# Patient Record
Sex: MALE
[De-identification: names, ages, dates, MRNs, and addresses within clinical notes are randomized per-mention and may not be internally consistent; named-entity substitution may affect disease eponyms.]

---

## 2023-02-16 PROBLEM — Z00.00 ENCOUNTER FOR PREVENTIVE HEALTH EXAMINATION: Status: ACTIVE | Noted: 2023-02-16

## 2023-03-28 ENCOUNTER — APPOINTMENT (OUTPATIENT)
Dept: ORTHOPEDIC SURGERY | Facility: CLINIC | Age: 78
End: 2023-03-28
Payer: MEDICARE

## 2023-03-28 VITALS — BODY MASS INDEX: 32.07 KG/M2 | HEIGHT: 70 IN | WEIGHT: 224 LBS

## 2023-03-28 DIAGNOSIS — Z80.9 FAMILY HISTORY OF MALIGNANT NEOPLASM, UNSPECIFIED: ICD-10-CM

## 2023-03-28 DIAGNOSIS — F17.200 NICOTINE DEPENDENCE, UNSPECIFIED, UNCOMPLICATED: ICD-10-CM

## 2023-03-28 DIAGNOSIS — M75.01 ADHESIVE CAPSULITIS OF RIGHT SHOULDER: ICD-10-CM

## 2023-03-28 DIAGNOSIS — M75.02 ADHESIVE CAPSULITIS OF LEFT SHOULDER: ICD-10-CM

## 2023-03-28 DIAGNOSIS — Z86.73 PERSONAL HISTORY OF TRANSIENT ISCHEMIC ATTACK (TIA), AND CEREBRAL INFARCTION W/OUT RESIDUAL DEFICITS: ICD-10-CM

## 2023-03-28 DIAGNOSIS — Z85.51 PERSONAL HISTORY OF MALIGNANT NEOPLASM OF BLADDER: ICD-10-CM

## 2023-03-28 DIAGNOSIS — Z87.39 PERSONAL HISTORY OF OTHER DISEASES OF THE MUSCULOSKELETAL SYSTEM AND CONNECTIVE TISSUE: ICD-10-CM

## 2023-03-28 DIAGNOSIS — Z87.09 PERSONAL HISTORY OF OTHER DISEASES OF THE RESPIRATORY SYSTEM: ICD-10-CM

## 2023-03-28 DIAGNOSIS — Z86.39 PERSONAL HISTORY OF OTHER ENDOCRINE, NUTRITIONAL AND METABOLIC DISEASE: ICD-10-CM

## 2023-03-28 PROCEDURE — 73030 X-RAY EXAM OF SHOULDER: CPT | Mod: 50

## 2023-03-28 PROCEDURE — 99204 OFFICE O/P NEW MOD 45 MIN: CPT

## 2023-03-28 RX ORDER — BICTEGRAVIR SODIUM, EMTRICITABINE, AND TENOFOVIR ALAFENAMIDE FUMARATE 30; 120; 15 MG/1; MG/1; MG/1
TABLET ORAL
Refills: 0 | Status: ACTIVE | COMMUNITY

## 2023-03-28 RX ORDER — LOSARTAN POTASSIUM 100 MG/1
TABLET, FILM COATED ORAL
Refills: 0 | Status: ACTIVE | COMMUNITY

## 2023-03-28 RX ORDER — SERTRALINE HYDROCHLORIDE 25 MG/1
TABLET, FILM COATED ORAL
Refills: 0 | Status: ACTIVE | COMMUNITY

## 2023-03-28 RX ORDER — EMPAGLIFLOZIN 25 MG/1
TABLET, FILM COATED ORAL
Refills: 0 | Status: ACTIVE | COMMUNITY

## 2023-09-25 ENCOUNTER — TRANSCRIPTION ENCOUNTER (OUTPATIENT)
Age: 78
End: 2023-09-25

## 2024-12-17 ENCOUNTER — APPOINTMENT (OUTPATIENT)
Dept: ORTHOPEDIC SURGERY | Facility: CLINIC | Age: 79
End: 2024-12-17
Payer: MEDICARE

## 2024-12-17 VITALS — WEIGHT: 213 LBS | BODY MASS INDEX: 30.84 KG/M2 | HEIGHT: 69.5 IN

## 2024-12-17 DIAGNOSIS — M75.121 COMPLETE ROTATOR CUFF TEAR OR RUPTURE OF RIGHT SHOULDER, NOT SPECIFIED AS TRAUMATIC: ICD-10-CM

## 2024-12-17 PROCEDURE — 20610 DRAIN/INJ JOINT/BURSA W/O US: CPT | Mod: RT

## 2024-12-17 PROCEDURE — 73030 X-RAY EXAM OF SHOULDER: CPT | Mod: RT

## 2024-12-17 PROCEDURE — 99212 OFFICE O/P EST SF 10 MIN: CPT | Mod: 25

## 2024-12-17 RX ORDER — UMECLIDINIUM BROMIDE AND VILANTEROL TRIFENATATE 62.5; 25 UG/1; UG/1
62.5-25 POWDER RESPIRATORY (INHALATION)
Refills: 0 | Status: ACTIVE | COMMUNITY

## 2024-12-18 RX ORDER — MELOXICAM 15 MG/1
15 TABLET ORAL
Qty: 30 | Refills: 0 | Status: ACTIVE | COMMUNITY
Start: 2024-12-18 | End: 1900-01-01

## 2025-01-08 ENCOUNTER — APPOINTMENT (OUTPATIENT)
Dept: MRI IMAGING | Facility: CLINIC | Age: 80
End: 2025-01-08

## 2025-01-08 ENCOUNTER — OUTPATIENT (OUTPATIENT)
Dept: OUTPATIENT SERVICES | Facility: HOSPITAL | Age: 80
LOS: 1 days | End: 2025-01-08

## 2025-01-08 PROCEDURE — 73221 MRI JOINT UPR EXTREM W/O DYE: CPT | Mod: 26,RT

## 2025-01-21 ENCOUNTER — APPOINTMENT (OUTPATIENT)
Dept: ORTHOPEDIC SURGERY | Facility: CLINIC | Age: 80
End: 2025-01-21
Payer: MEDICARE

## 2025-01-21 VITALS — WEIGHT: 213 LBS | BODY MASS INDEX: 30.84 KG/M2 | HEIGHT: 69.5 IN

## 2025-01-21 DIAGNOSIS — M75.121 COMPLETE ROTATOR CUFF TEAR OR RUPTURE OF RIGHT SHOULDER, NOT SPECIFIED AS TRAUMATIC: ICD-10-CM

## 2025-01-21 DIAGNOSIS — M75.41 IMPINGEMENT SYNDROME OF RIGHT SHOULDER: ICD-10-CM

## 2025-01-21 PROCEDURE — 99213 OFFICE O/P EST LOW 20 MIN: CPT

## 2025-02-17 ENCOUNTER — OUTPATIENT (OUTPATIENT)
Dept: OUTPATIENT SERVICES | Facility: HOSPITAL | Age: 80
LOS: 1 days | End: 2025-02-17

## 2025-02-17 ENCOUNTER — RESULT REVIEW (OUTPATIENT)
Age: 80
End: 2025-02-17

## 2025-02-17 ENCOUNTER — APPOINTMENT (OUTPATIENT)
Dept: CT IMAGING | Facility: CLINIC | Age: 80
End: 2025-02-17
Payer: MEDICARE

## 2025-02-17 PROCEDURE — 73200 CT UPPER EXTREMITY W/O DYE: CPT | Mod: 26,RT

## 2025-02-17 PROCEDURE — 76376 3D RENDER W/INTRP POSTPROCES: CPT | Mod: 26

## 2025-03-11 RX ORDER — OXYCODONE 5 MG/1
5 TABLET ORAL EVERY 6 HOURS
Qty: 20 | Refills: 0 | Status: ACTIVE | COMMUNITY
Start: 2025-03-11 | End: 1900-01-01

## 2025-03-11 RX ORDER — ACETAMINOPHEN 500 MG/1
500 TABLET ORAL 4 TIMES DAILY
Qty: 112 | Refills: 0 | Status: ACTIVE | COMMUNITY
Start: 2025-03-11 | End: 1900-01-01

## 2025-03-11 RX ORDER — ACETAMINOPHEN AND CODEINE PHOSPHATE 300; 30 MG/1; MG/1
300-30 TABLET ORAL
Qty: 30 | Refills: 0 | Status: ACTIVE | COMMUNITY
Start: 2025-03-11 | End: 1900-01-01

## 2025-03-14 VITALS
TEMPERATURE: 98 F | WEIGHT: 211.64 LBS | RESPIRATION RATE: 16 BRPM | HEART RATE: 63 BPM | HEIGHT: 69.5 IN | DIASTOLIC BLOOD PRESSURE: 70 MMHG | OXYGEN SATURATION: 98 % | SYSTOLIC BLOOD PRESSURE: 149 MMHG

## 2025-03-14 RX ORDER — POVIDONE-IODINE 7.5 %
1 SOLUTION, NON-ORAL TOPICAL ONCE
Refills: 0 | Status: COMPLETED | OUTPATIENT
Start: 2025-03-17 | End: 2025-03-17

## 2025-03-14 NOTE — ASU PATIENT PROFILE, ADULT - FALL HARM RISK - RISK INTERVENTIONS

## 2025-03-14 NOTE — H&P ADULT - HISTORY OF PRESENT ILLNESS
79M c/o right shoulder pain     ASES 32     Present for elective right reverse total shoulder replacement  79M c/o right shoulder pain for about 4 years that has worsened over time without improvement. Pt states that he has been trying to tolerate the pain but it has been getting worse and worse and he can no longer take the pain. It is preventing him from doing many things in his life. Pt is right hand dominant. Failed conservative treatments. Denies numbness, tingling, weakness. Ambulates with the ue of a walking stick at baseline. Denies hx of CP, MI, stroke, seizures, n/v, blood thinners.    ASES 32     Present for elective right reverse total shoulder replacement  79M c/o right shoulder pain for about 4 years that has worsened over time without improvement. Pt states that he has been trying to tolerate the pain but it has been getting worse and worse and he can no longer take the pain. It is preventing him from doing many things in his life. Pt is right hand dominant. Failed conservative treatments. Denies numbness, tingling, weakness. Ambulates with the ue of a walking stick at baseline. Denies hx of CP, MI, stroke, seizures, n/v.    ASES 32     Present for elective right reverse total shoulder replacement

## 2025-03-14 NOTE — H&P ADULT - NSHPLABSRESULTS_GEN_ALL_CORE
Preop CBC, BMP, PT/PTT/INR, UA - WNL per medical clearance   H/H 13.9/44.2   Cr 1.24  A1C 6.5   Preop EKG - NSR with PACs - WNL per medical clearance (repeat if tracing missing DOS)  Echo 2025 WNL

## 2025-03-14 NOTE — H&P ADULT - NSHPPHYSICALEXAM_GEN_ALL_CORE
MSK: + decreased ROM 2/2 pain, right shoulder      Remainder of exam per medical clearance note Gen: Alert and oriented x 3. NAD  MSK: + decreased ROM 2/2 pain, right shoulder  Skin warm without erythema, ecchymosis, abrasions or lesions, signs of infection  Calves soft, nontender bilaterally.   Sensation intact to light touch bilateral lower extremities  Bicep/tricp RUE 1/5 (due to pain), LUE 5/5,   5/5 b/l    Rest of PE per MD clearance Gen: Alert and oriented x 3. NAD  MSK: + decreased ROM 2/2 pain, right shoulder  Skin warm without erythema, ecchymosis, abrasions or lesions, signs of infection  Calves soft, nontender bilaterally.   Sensation intact to light touch bilateral upper extremities  Bicep/tricp RUE 1/5 (due to pain), LUE 5/5,   5/5 b/l    Rest of PE per MD clearance Gen: Alert and oriented x 3. NAD  MSK: + decreased ROM 2/2 pain, right shoulder  Skin warm without erythema, ecchymosis, abrasions or lesions, signs of infection  Calves soft, nontender bilaterally.   Sensation intact to light touch bilateral upper extremities  Bicep/tricp RUE 1/5 (due to pain), LUE 5/5,   5/5 b/l  right shoulder flexion/extension/abduction limited 2/2 pain    Rest of PE per MD clearance

## 2025-03-14 NOTE — H&P ADULT - NSICDXPASTMEDICALHX_GEN_ALL_CORE_FT
PAST MEDICAL HISTORY:  Osteoarthritis of right shoulder      PAST MEDICAL HISTORY:  COPD, mild     CVA (cerebrovascular accident) 2020    DM (diabetes mellitus)     HIV disease     Kaposi sarcoma stomach    Osteoarthritis of right shoulder

## 2025-03-14 NOTE — ASU PATIENT PROFILE, ADULT - NSICDXPASTMEDICALHX_GEN_ALL_CORE_FT
PAST MEDICAL HISTORY:  CVA (cerebrovascular accident)     DM (diabetes mellitus)     HIV disease     Kaposi sarcoma stomach    Osteoarthritis of right shoulder      PAST MEDICAL HISTORY:  CVA (cerebrovascular accident) 2020    DM (diabetes mellitus)     HIV disease     Kaposi sarcoma stomach    Osteoarthritis of right shoulder      PAST MEDICAL HISTORY:  COPD, mild     CVA (cerebrovascular accident) 2020    DM (diabetes mellitus)     HIV disease     Kaposi sarcoma stomach    Osteoarthritis of right shoulder

## 2025-03-14 NOTE — H&P ADULT - NSHPOUTPATIENTPROVIDERS_GEN_ALL_CORE
Medical clearance per Dr. Herrera, Pulm clearance per Dr. Collins, Cardiac clearance per Dr. Toribio

## 2025-03-14 NOTE — H&P ADULT - PROBLEM SELECTOR PLAN 1
Admit to Orthopaedic Service.  Presents today for elective right reverse TSR   Pt medically stable and cleared for procedure today by Dr. Salamanca, Dr. Herrera, Dr. Toribio

## 2025-03-17 ENCOUNTER — APPOINTMENT (OUTPATIENT)
Dept: ORTHOPEDIC SURGERY | Facility: HOSPITAL | Age: 80
End: 2025-03-17

## 2025-03-17 ENCOUNTER — OUTPATIENT (OUTPATIENT)
Dept: OUTPATIENT SERVICES | Facility: HOSPITAL | Age: 80
LOS: 1 days | Discharge: HOME | End: 2025-03-17
Payer: MEDICARE

## 2025-03-17 ENCOUNTER — TRANSCRIPTION ENCOUNTER (OUTPATIENT)
Age: 80
End: 2025-03-17

## 2025-03-17 VITALS
DIASTOLIC BLOOD PRESSURE: 59 MMHG | OXYGEN SATURATION: 97 % | HEART RATE: 46 BPM | SYSTOLIC BLOOD PRESSURE: 124 MMHG | RESPIRATION RATE: 19 BRPM

## 2025-03-17 DIAGNOSIS — E11.9 TYPE 2 DIABETES MELLITUS WITHOUT COMPLICATIONS: ICD-10-CM

## 2025-03-17 DIAGNOSIS — B20 HUMAN IMMUNODEFICIENCY VIRUS [HIV] DISEASE: ICD-10-CM

## 2025-03-17 DIAGNOSIS — I63.9 CEREBRAL INFARCTION, UNSPECIFIED: ICD-10-CM

## 2025-03-17 DIAGNOSIS — Z98.890 OTHER SPECIFIED POSTPROCEDURAL STATES: Chronic | ICD-10-CM

## 2025-03-17 DIAGNOSIS — M19.011 PRIMARY OSTEOARTHRITIS, RIGHT SHOULDER: ICD-10-CM

## 2025-03-17 DIAGNOSIS — C46.9 KAPOSI'S SARCOMA, UNSPECIFIED: ICD-10-CM

## 2025-03-17 DIAGNOSIS — J44.9 CHRONIC OBSTRUCTIVE PULMONARY DISEASE, UNSPECIFIED: ICD-10-CM

## 2025-03-17 PROCEDURE — 23472 RECONSTRUCT SHOULDER JOINT: CPT | Mod: RT

## 2025-03-17 PROCEDURE — C1776: CPT

## 2025-03-17 PROCEDURE — 76000 FLUOROSCOPY <1 HR PHYS/QHP: CPT

## 2025-03-17 PROCEDURE — 93005 ELECTROCARDIOGRAM TRACING: CPT

## 2025-03-17 PROCEDURE — C1713: CPT

## 2025-03-17 PROCEDURE — 82962 GLUCOSE BLOOD TEST: CPT

## 2025-03-17 PROCEDURE — 93010 ELECTROCARDIOGRAM REPORT: CPT

## 2025-03-17 DEVICE — SCREW LOKG COMP REV 4.5X26MM: Type: IMPLANTABLE DEVICE | Site: RIGHT SHOULDER | Status: FUNCTIONAL

## 2025-03-17 DEVICE — KIT SCREW TORQUE DEF: Type: IMPLANTABLE DEVICE | Site: RIGHT SHOULDER | Status: FUNCTIONAL

## 2025-03-17 DEVICE — TRAY HUM REV ADPTER PLUS 0MM: Type: IMPLANTABLE DEVICE | Site: RIGHT SHOULDER | Status: FUNCTIONAL

## 2025-03-17 DEVICE — IMP EQUINOXE REV GLENOSPHERE SM 36MM: Type: IMPLANTABLE DEVICE | Site: RIGHT SHOULDER | Status: FUNCTIONAL

## 2025-03-17 DEVICE — SCREW LOKG REV EQ: Type: IMPLANTABLE DEVICE | Site: RIGHT SHOULDER | Status: FUNCTIONAL

## 2025-03-17 DEVICE — LINER HUM EQUINOXE 36MM PLUS 0MM STRL: Type: IMPLANTABLE DEVICE | Site: RIGHT SHOULDER | Status: FUNCTIONAL

## 2025-03-17 DEVICE — K-WIRE EXACTECH (TREADED) SHORT 3.2MM: Type: IMPLANTABLE DEVICE | Site: RIGHT SHOULDER | Status: FUNCTIONAL

## 2025-03-17 DEVICE — K-WIRE EXACTECH TROCAR TIP 3.2MM: Type: IMPLANTABLE DEVICE | Site: RIGHT SHOULDER | Status: FUNCTIONAL

## 2025-03-17 DEVICE — IMPLANTABLE DEVICE: Type: IMPLANTABLE DEVICE | Site: RIGHT SHOULDER | Status: FUNCTIONAL

## 2025-03-17 DEVICE — SCREW LOKG COMP REV 4.5X18MM: Type: IMPLANTABLE DEVICE | Site: RIGHT SHOULDER | Status: FUNCTIONAL

## 2025-03-17 RX ORDER — SERTRALINE 100 MG/1
1 TABLET, FILM COATED ORAL
Refills: 0 | DISCHARGE

## 2025-03-17 RX ORDER — ONDANSETRON HCL/PF 4 MG/2 ML
4 VIAL (ML) INJECTION ONCE
Refills: 0 | Status: DISCONTINUED | OUTPATIENT
Start: 2025-03-17 | End: 2025-03-17

## 2025-03-17 RX ORDER — LOSARTAN POTASSIUM 100 MG/1
1 TABLET, FILM COATED ORAL
Refills: 0 | DISCHARGE

## 2025-03-17 RX ORDER — CEFAZOLIN SODIUM IN 0.9 % NACL 3 G/100 ML
1000 INTRAVENOUS SOLUTION, PIGGYBACK (ML) INTRAVENOUS ONCE
Refills: 0 | Status: COMPLETED | OUTPATIENT
Start: 2025-03-17 | End: 2025-03-17

## 2025-03-17 RX ORDER — INSULIN ASPART 100 [IU]/ML
0 INJECTION, SOLUTION INTRAVENOUS; SUBCUTANEOUS
Refills: 0 | DISCHARGE

## 2025-03-17 RX ORDER — EMPAGLIFLOZIN 25 MG/1
1 TABLET, FILM COATED ORAL
Refills: 0 | DISCHARGE

## 2025-03-17 RX ORDER — BICTEGRAVIR SODIUM, EMTRICITABINE, AND TENOFOVIR ALAFENAMIDE FUMARATE 30; 120; 15 MG/1; MG/1; MG/1
1 TABLET ORAL
Refills: 0 | DISCHARGE

## 2025-03-17 RX ORDER — HYDROMORPHONE/SOD CHLOR,ISO/PF 2 MG/10 ML
0.5 SYRINGE (ML) INJECTION ONCE
Refills: 0 | Status: DISCONTINUED | OUTPATIENT
Start: 2025-03-17 | End: 2025-03-17

## 2025-03-17 RX ORDER — ASPIRIN 325 MG
2 TABLET ORAL
Refills: 0 | DISCHARGE

## 2025-03-17 RX ORDER — ACETAMINOPHEN 500 MG/5ML
1000 LIQUID (ML) ORAL ONCE
Refills: 0 | Status: COMPLETED | OUTPATIENT
Start: 2025-03-17 | End: 2025-03-17

## 2025-03-17 RX ORDER — BUPIVACAINE 13.3 MG/ML
20 INJECTION, SUSPENSION, LIPOSOMAL INFILTRATION ONCE
Refills: 0 | Status: DISCONTINUED | OUTPATIENT
Start: 2025-03-17 | End: 2025-03-17

## 2025-03-17 RX ORDER — ALBUTEROL SULFATE 2.5 MG/3ML
2 VIAL, NEBULIZER (ML) INHALATION
Refills: 0 | DISCHARGE

## 2025-03-17 RX ORDER — INSULIN GLARGINE-YFGN 100 [IU]/ML
0 INJECTION, SOLUTION SUBCUTANEOUS
Refills: 0 | DISCHARGE

## 2025-03-17 RX ADMIN — Medication 100 MILLIGRAM(S): at 11:49

## 2025-03-17 RX ADMIN — Medication 1 APPLICATION(S): at 06:54

## 2025-03-17 RX ADMIN — Medication 1 APPLICATION(S): at 06:59

## 2025-03-17 RX ADMIN — Medication 1000 MILLIGRAM(S): at 11:49

## 2025-03-17 RX ADMIN — Medication 1000 MILLIGRAM(S): at 06:54

## 2025-03-17 NOTE — ASU DISCHARGE PLAN (ADULT/PEDIATRIC) - NS MD DC FALL RISK RISK
For information on Fall & Injury Prevention, visit: [email or web address] OR  [email or web address] OR  https://www.cdc.gov/steadi/patient.html

## 2025-03-17 NOTE — ASU DISCHARGE PLAN (ADULT/PEDIATRIC) - CARE PROVIDER_API CALL
Aldair Chow Ben  Orthopaedic Surgery  [address] [place] [address], [address]  [place] [address]  Phone: [phone]  Fax: [phone]  Follow Up Time: 2 weeks

## 2025-03-17 NOTE — ASU DISCHARGE PLAN (ADULT/PEDIATRIC) - FINANCIAL ASSISTANCE
[facility] provides services at a reduced cost to those who are determined to be eligible through [facility]’s financial assistance program. Information regarding [facility]’s financial assistance program can be found by going to [email or web address] or by calling [phone].

## 2025-03-17 NOTE — BRIEF OPERATIVE NOTE - NSICDXBRIEFPROCEDURE_GEN_ALL_CORE_FT
PROCEDURES:  Reverse total replacement of right shoulder joint 17-Mar-2025 10:13:18  Angelika Davalos

## 2025-03-18 PROBLEM — C46.9 KAPOSI'S SARCOMA, UNSPECIFIED: Chronic | Status: ACTIVE | Noted: 2025-03-14

## 2025-03-18 PROBLEM — B20 HUMAN IMMUNODEFICIENCY VIRUS [HIV] DISEASE: Chronic | Status: ACTIVE | Noted: 2025-03-14

## 2025-03-18 PROBLEM — J44.9 CHRONIC OBSTRUCTIVE PULMONARY DISEASE, UNSPECIFIED: Chronic | Status: ACTIVE | Noted: 2025-03-14

## 2025-03-19 ENCOUNTER — APPOINTMENT (OUTPATIENT)
Dept: ORTHOPEDIC SURGERY | Facility: CLINIC | Age: 80
End: 2025-03-19
Payer: MEDICARE

## 2025-03-19 DIAGNOSIS — M75.41 IMPINGEMENT SYNDROME OF RIGHT SHOULDER: ICD-10-CM

## 2025-03-19 DIAGNOSIS — M75.121 COMPLETE ROTATOR CUFF TEAR OR RUPTURE OF RIGHT SHOULDER, NOT SPECIFIED AS TRAUMATIC: ICD-10-CM

## 2025-03-19 PROBLEM — I63.9 CEREBRAL INFARCTION, UNSPECIFIED: Chronic | Status: ACTIVE | Noted: 2025-03-14

## 2025-03-19 PROBLEM — E11.9 TYPE 2 DIABETES MELLITUS WITHOUT COMPLICATIONS: Chronic | Status: ACTIVE | Noted: 2025-03-14

## 2025-03-19 PROBLEM — M19.011 PRIMARY OSTEOARTHRITIS, RIGHT SHOULDER: Chronic | Status: ACTIVE | Noted: 2025-03-14

## 2025-03-19 PROCEDURE — 99024 POSTOP FOLLOW-UP VISIT: CPT

## 2025-04-03 ENCOUNTER — APPOINTMENT (OUTPATIENT)
Dept: ORTHOPEDIC SURGERY | Facility: CLINIC | Age: 80
End: 2025-04-03
Payer: MEDICARE

## 2025-04-03 VITALS — BODY MASS INDEX: 30.84 KG/M2 | WEIGHT: 213 LBS | HEIGHT: 69.5 IN

## 2025-04-03 DIAGNOSIS — M75.121 COMPLETE ROTATOR CUFF TEAR OR RUPTURE OF RIGHT SHOULDER, NOT SPECIFIED AS TRAUMATIC: ICD-10-CM

## 2025-04-03 PROCEDURE — 99024 POSTOP FOLLOW-UP VISIT: CPT

## 2025-04-03 PROCEDURE — 73030 X-RAY EXAM OF SHOULDER: CPT | Mod: RT

## 2025-05-01 ENCOUNTER — APPOINTMENT (OUTPATIENT)
Dept: ORTHOPEDIC SURGERY | Facility: CLINIC | Age: 80
End: 2025-05-01
Payer: MEDICARE

## 2025-05-01 VITALS — WEIGHT: 202 LBS | HEIGHT: 69.5 IN | BODY MASS INDEX: 29.25 KG/M2

## 2025-05-01 DIAGNOSIS — M75.121 COMPLETE ROTATOR CUFF TEAR OR RUPTURE OF RIGHT SHOULDER, NOT SPECIFIED AS TRAUMATIC: ICD-10-CM

## 2025-05-01 DIAGNOSIS — Z96.611 PRESENCE OF RIGHT ARTIFICIAL SHOULDER JOINT: ICD-10-CM

## 2025-05-01 PROCEDURE — 99024 POSTOP FOLLOW-UP VISIT: CPT

## 2025-05-02 ENCOUNTER — APPOINTMENT (OUTPATIENT)
Dept: ORTHOPEDIC SURGERY | Facility: CLINIC | Age: 80
End: 2025-05-02

## 2025-05-02 DIAGNOSIS — M79.641 PAIN IN RIGHT HAND: ICD-10-CM

## 2025-05-02 DIAGNOSIS — M79.642 PAIN IN RIGHT HAND: ICD-10-CM

## 2025-05-02 DIAGNOSIS — G56.03 CARPAL TUNNEL SYNDROM,BILATERAL UPPER LIMBS: ICD-10-CM

## 2025-05-02 PROCEDURE — 20526 THER INJECTION CARP TUNNEL: CPT | Mod: 79,LT

## 2025-05-02 PROCEDURE — 73110 X-RAY EXAM OF WRIST: CPT | Mod: 50

## 2025-05-02 PROCEDURE — 99214 OFFICE O/P EST MOD 30 MIN: CPT | Mod: 24,25

## 2025-06-10 ENCOUNTER — APPOINTMENT (OUTPATIENT)
Dept: ORTHOPEDIC SURGERY | Facility: CLINIC | Age: 80
End: 2025-06-10
Payer: MEDICARE

## 2025-06-10 VITALS — HEIGHT: 69.5 IN | WEIGHT: 202 LBS | BODY MASS INDEX: 29.25 KG/M2

## 2025-06-10 PROCEDURE — 99024 POSTOP FOLLOW-UP VISIT: CPT

## 2025-09-09 ENCOUNTER — APPOINTMENT (OUTPATIENT)
Dept: ORTHOPEDIC SURGERY | Facility: CLINIC | Age: 80
End: 2025-09-09
Payer: MEDICARE

## 2025-09-09 VITALS — HEIGHT: 69.5 IN | WEIGHT: 202 LBS | BODY MASS INDEX: 29.25 KG/M2 | RESPIRATION RATE: 17 BRPM

## 2025-09-09 DIAGNOSIS — Z96.611 PRESENCE OF RIGHT ARTIFICIAL SHOULDER JOINT: ICD-10-CM

## 2025-09-09 DIAGNOSIS — M75.42 IMPINGEMENT SYNDROME OF LEFT SHOULDER: ICD-10-CM

## 2025-09-09 PROCEDURE — 73030 X-RAY EXAM OF SHOULDER: CPT | Mod: 50

## 2025-09-09 PROCEDURE — 99212 OFFICE O/P EST SF 10 MIN: CPT

## (undated) DEVICE — DRSG COMBINE 5 X 9"

## (undated) DEVICE — SUT FIBERWIRE #2 38" STRAND 1 BLUE T-5 TAPER

## (undated) DEVICE — DRSG COBAN 6"

## (undated) DEVICE — DRAPE MAGNETIC INSTRUMENT MEDIUM

## (undated) DEVICE — MARKING PEN W RULER

## (undated) DEVICE — SUT VICRYL 0 36" CT-1 UNDYED

## (undated) DEVICE — WOUND IRR IRRISEPT W 0.5 CHG

## (undated) DEVICE — SAW BLADE STRYKER SAGITTAL 12.5X73X0.64MM

## (undated) DEVICE — NDL HYPO SAFE 18G X 1.5" (PINK)

## (undated) DEVICE — DRAPE MAYO STAND 23"

## (undated) DEVICE — DRILL BIT EXACTECH ERGO 3.2MM

## (undated) DEVICE — SYR TOOMEY 50ML

## (undated) DEVICE — POSITIONER UNIVERSAL HEAD RESTRAINT DISP

## (undated) DEVICE — SYR LUER LOK 30CC

## (undated) DEVICE — DRSG AQUACEL 3.5 X 6"

## (undated) DEVICE — DRAPE MAYO STAND 30"

## (undated) DEVICE — SUT VICRYL 2-0 27" SH UNDYED

## (undated) DEVICE — SUT VICRYL 2-0 27" CT-1 UNDYED

## (undated) DEVICE — ZIMMER MIXING BOWL WITH SPATULA

## (undated) DEVICE — VENODYNE/SCD SLEEVE CALF MEDIUM

## (undated) DEVICE — NDL HYPO SAFE 22G X 1.5" (BLACK)

## (undated) DEVICE — DRAPE TOWEL BLUE 17" X 24"

## (undated) DEVICE — SUT PROLENE 3-0 18" PS-1

## (undated) DEVICE — DRAPE U (BLUE) 60 X 72"

## (undated) DEVICE — DRAPE U (CLEAR) 47 X 51"

## (undated) DEVICE — PACK ORTHO SHOULDER OPEN LF

## (undated) DEVICE — SUT ETHIBOND EXCEL 2 30" OS-4

## (undated) DEVICE — DRAPE INSTRUMENT POUCH 6.75" X 11"

## (undated) DEVICE — DRSG STERISTRIPS 0.5 X 4"

## (undated) DEVICE — POSITIONING ARTHREX TRIMANO BEACH CHAIR

## (undated) DEVICE — DRSG KERLIX ROLL LG 4.5"

## (undated) DEVICE — DRAPE C ARM MINI

## (undated) DEVICE — SUT VICRYL 2-0 27" CT-1

## (undated) DEVICE — PACK TOTAL OPEN SHOULDER

## (undated) DEVICE — SUT NDL MAYO CATGUT 1/2 CIRCLE TAPER POINT 0.050" X 1.248"

## (undated) DEVICE — SYR CONTROL LUER LOK 10CC

## (undated) DEVICE — GLV 7.5 PROTEXIS (WHITE)

## (undated) DEVICE — SUT FIBERWIRE #2 38" STRAND 1 BLUE 1 WHITE/BLACK

## (undated) DEVICE — WARMING BLANKET LOWER ADULT

## (undated) DEVICE — DRSG GAUZE MOISTURIZER 0.5 OZ 4X8